# Patient Record
Sex: FEMALE | Race: BLACK OR AFRICAN AMERICAN | NOT HISPANIC OR LATINO
[De-identification: names, ages, dates, MRNs, and addresses within clinical notes are randomized per-mention and may not be internally consistent; named-entity substitution may affect disease eponyms.]

---

## 2018-10-22 ENCOUNTER — RESULT REVIEW (OUTPATIENT)
Age: 38
End: 2018-10-22

## 2018-10-22 ENCOUNTER — TRANSCRIPTION ENCOUNTER (OUTPATIENT)
Age: 38
End: 2018-10-22

## 2018-10-22 ENCOUNTER — APPOINTMENT (OUTPATIENT)
Dept: HEMATOLOGY ONCOLOGY | Facility: CLINIC | Age: 38
End: 2018-10-22
Payer: COMMERCIAL

## 2018-10-22 VITALS
SYSTOLIC BLOOD PRESSURE: 124 MMHG | RESPIRATION RATE: 16 BRPM | HEIGHT: 67.4 IN | TEMPERATURE: 98.5 F | DIASTOLIC BLOOD PRESSURE: 72 MMHG | HEART RATE: 57 BPM | BODY MASS INDEX: 25.75 KG/M2 | OXYGEN SATURATION: 100 % | WEIGHT: 165.99 LBS

## 2018-10-22 DIAGNOSIS — Z86.39 PERSONAL HISTORY OF OTHER ENDOCRINE, NUTRITIONAL AND METABOLIC DISEASE: ICD-10-CM

## 2018-10-22 DIAGNOSIS — Z87.09 PERSONAL HISTORY OF OTHER DISEASES OF THE RESPIRATORY SYSTEM: ICD-10-CM

## 2018-10-22 DIAGNOSIS — Z82.0 FAMILY HISTORY OF EPILEPSY AND OTHER DISEASES OF THE NERVOUS SYSTEM: ICD-10-CM

## 2018-10-22 DIAGNOSIS — Z84.81 FAMILY HISTORY OF CARRIER OF GENETIC DISEASE: ICD-10-CM

## 2018-10-22 PROCEDURE — 99205 OFFICE O/P NEW HI 60 MIN: CPT

## 2018-10-22 RX ORDER — ALBUTEROL SULFATE 90 UG/1
AEROSOL, METERED RESPIRATORY (INHALATION)
Refills: 0 | Status: ACTIVE | COMMUNITY

## 2018-10-22 RX ORDER — LEVOTHYROXINE SODIUM 0.15 MG/1
150 TABLET ORAL
Refills: 0 | Status: ACTIVE | COMMUNITY

## 2018-10-22 RX ORDER — LEVOTHYROXINE SODIUM 0.2 MG/1
200 TABLET ORAL
Refills: 0 | Status: COMPLETED | COMMUNITY
End: 2018-10-22

## 2018-10-22 RX ORDER — FEXOFENADINE HYDROCHLORIDE 180 MG/1
180 TABLET, FILM COATED ORAL
Refills: 0 | Status: ACTIVE | COMMUNITY

## 2018-10-22 RX ORDER — ALBUTEROL SULFATE 2.5 MG/3ML
(2.5 MG/3ML) SOLUTION RESPIRATORY (INHALATION)
Refills: 0 | Status: ACTIVE | COMMUNITY

## 2018-10-23 ENCOUNTER — RESULT REVIEW (OUTPATIENT)
Age: 38
End: 2018-10-23

## 2018-10-23 ENCOUNTER — APPOINTMENT (OUTPATIENT)
Dept: HEMATOLOGY ONCOLOGY | Facility: CLINIC | Age: 38
End: 2018-10-23
Payer: COMMERCIAL

## 2018-10-23 PROCEDURE — 99499A: CUSTOM | Mod: NC

## 2018-11-16 ENCOUNTER — RECORD ABSTRACTING (OUTPATIENT)
Age: 38
End: 2018-11-16

## 2018-11-16 DIAGNOSIS — Z78.9 OTHER SPECIFIED HEALTH STATUS: ICD-10-CM

## 2018-12-13 ENCOUNTER — APPOINTMENT (OUTPATIENT)
Dept: HEMATOLOGY ONCOLOGY | Facility: CLINIC | Age: 38
End: 2018-12-13

## 2019-06-25 ENCOUNTER — TRANSCRIPTION ENCOUNTER (OUTPATIENT)
Age: 39
End: 2019-06-25

## 2019-06-25 ENCOUNTER — APPOINTMENT (OUTPATIENT)
Dept: OBGYN | Facility: CLINIC | Age: 39
End: 2019-06-25
Payer: COMMERCIAL

## 2019-06-25 PROCEDURE — 99213 OFFICE O/P EST LOW 20 MIN: CPT

## 2019-07-15 LAB
C TRACH RRNA SPEC QL NAA+PROBE: NOT DETECTED
CANDIDA VAG CYTO: NOT DETECTED
G VAGINALIS+PREV SP MTYP VAG QL MICRO: NOT DETECTED
HBV SURFACE AG SER QL: NONREACTIVE
HCV AB SER QL: NONREACTIVE
HCV S/CO RATIO: 0.09 S/CO
HIV1+2 AB SPEC QL IA.RAPID: NONREACTIVE
HSV 1+2 IGG SER IA-IMP: NEGATIVE
HSV 1+2 IGG SER IA-IMP: NEGATIVE
HSV1 IGG SER QL: 0.18 INDEX
HSV1 IGM SER QL: NORMAL TITER
HSV2 AB FLD-ACNC: NORMAL TITER
HSV2 IGG SER QL: 0.09 INDEX
N GONORRHOEA RRNA SPEC QL NAA+PROBE: NOT DETECTED
SOURCE AMPLIFICATION: NORMAL
T PALLIDUM AB SER QL IA: NEGATIVE
T VAGINALIS VAG QL WET PREP: NOT DETECTED

## 2019-07-17 ENCOUNTER — APPOINTMENT (OUTPATIENT)
Dept: OBGYN | Facility: CLINIC | Age: 39
End: 2019-07-17
Payer: COMMERCIAL

## 2019-07-17 VITALS
HEIGHT: 67 IN | WEIGHT: 165 LBS | DIASTOLIC BLOOD PRESSURE: 70 MMHG | SYSTOLIC BLOOD PRESSURE: 112 MMHG | BODY MASS INDEX: 25.9 KG/M2

## 2019-07-17 DIAGNOSIS — D21.9 BENIGN NEOPLASM OF CONNECTIVE AND OTHER SOFT TISSUE, UNSPECIFIED: ICD-10-CM

## 2019-07-17 PROCEDURE — 99395 PREV VISIT EST AGE 18-39: CPT

## 2019-07-22 LAB — HPV HIGH+LOW RISK DNA PNL CVX: NOT DETECTED

## 2019-07-22 NOTE — HISTORY OF PRESENT ILLNESS
[1 Year Ago] : 1 year ago [Good] : being in good health [Healthy Diet] : a healthy diet [Regular Exercise] : regular exercise [Definite:  ___ (Date)] : the last menstrual period was [unfilled] [Normal Amount/Duration] : was of a normal amount and duration [Spotting Between  Menses] : no spotting between menses [Regular Cycle Intervals] : periods have been regular [Menstrual Cramps] : no menstrual cramps [Sexually Active] : is sexually active [Monogamous] : is monogamous

## 2019-07-29 LAB — CYTOLOGY CVX/VAG DOC THIN PREP: ABNORMAL

## 2019-08-26 ENCOUNTER — RX RENEWAL (OUTPATIENT)
Age: 39
End: 2019-08-26

## 2019-11-04 DIAGNOSIS — R92.2 INCONCLUSIVE MAMMOGRAM: ICD-10-CM

## 2019-11-21 PROBLEM — R92.2 DENSE BREASTS: Status: ACTIVE | Noted: 2019-11-21

## 2019-11-27 ENCOUNTER — RESULT REVIEW (OUTPATIENT)
Age: 39
End: 2019-11-27

## 2020-02-03 ENCOUNTER — RX CHANGE (OUTPATIENT)
Age: 40
End: 2020-02-03

## 2020-02-06 ENCOUNTER — APPOINTMENT (OUTPATIENT)
Dept: OBGYN | Facility: CLINIC | Age: 40
End: 2020-02-06
Payer: COMMERCIAL

## 2020-02-06 VITALS
BODY MASS INDEX: 25.9 KG/M2 | WEIGHT: 165 LBS | SYSTOLIC BLOOD PRESSURE: 110 MMHG | HEIGHT: 67 IN | DIASTOLIC BLOOD PRESSURE: 70 MMHG

## 2020-02-06 DIAGNOSIS — N89.8 OTHER SPECIFIED NONINFLAMMATORY DISORDERS OF VAGINA: ICD-10-CM

## 2020-02-06 DIAGNOSIS — Z92.89 PERSONAL HISTORY OF OTHER MEDICAL TREATMENT: ICD-10-CM

## 2020-02-06 DIAGNOSIS — Z86.19 PERSONAL HISTORY OF OTHER INFECTIOUS AND PARASITIC DISEASES: ICD-10-CM

## 2020-02-06 DIAGNOSIS — Z01.419 ENCOUNTER FOR GYNECOLOGICAL EXAMINATION (GENERAL) (ROUTINE) W/OUT ABNORMAL FINDINGS: ICD-10-CM

## 2020-02-06 DIAGNOSIS — Z11.51 ENCOUNTER FOR SCREENING FOR HUMAN PAPILLOMAVIRUS (HPV): ICD-10-CM

## 2020-02-06 PROCEDURE — 36415 COLL VENOUS BLD VENIPUNCTURE: CPT

## 2020-02-06 PROCEDURE — 99213 OFFICE O/P EST LOW 20 MIN: CPT

## 2020-02-06 RX ORDER — FLUCONAZOLE 150 MG/1
150 TABLET ORAL
Qty: 2 | Refills: 0 | Status: DISCONTINUED | COMMUNITY
Start: 2019-08-26 | End: 2020-02-06

## 2020-02-06 NOTE — REVIEW OF SYSTEMS
[Vaginal Discharge] : vaginal discharge [Vaginal Itching] : vaginal itching [Vaginal Odor] : vaginal odor [Nl] : Integumentary

## 2020-02-06 NOTE — PHYSICAL EXAM
[Awake] : awake [Alert] : alert [Soft] : soft [Normal Mental Status] : the patient was oriented to person, place and time [Appropriate] : appropriate [No Lesions] : no genitalia lesions [Normal] : cervix [Discharge] : a  ~M vaginal discharge was present [Moderate] : moderate [No Bleeding] : there was no active vaginal bleeding [White] : white [Thick] : thick [Uterine Adnexae] : were not tender and not enlarged [Acute Distress] : no acute distress [Tender] : non tender [Erythema] : no erythema [Motion Tenderness] : there was no cervical motion tenderness [Foul Smelling] : not foul smelling

## 2020-02-07 LAB
C TRACH RRNA SPEC QL NAA+PROBE: NOT DETECTED
HBV SURFACE AG SER QL: NONREACTIVE
HCV AB SER QL: NONREACTIVE
HCV S/CO RATIO: 0.15 S/CO
N GONORRHOEA RRNA SPEC QL NAA+PROBE: NOT DETECTED
SOURCE AMPLIFICATION: NORMAL
T PALLIDUM AB SER QL IA: NEGATIVE

## 2020-02-10 LAB
CANDIDA VAG CYTO: NOT DETECTED
G VAGINALIS+PREV SP MTYP VAG QL MICRO: DETECTED
HIV1+2 AB SPEC QL IA.RAPID: NONREACTIVE
T VAGINALIS VAG QL WET PREP: NOT DETECTED

## 2020-04-20 ENCOUNTER — RESULT REVIEW (OUTPATIENT)
Age: 40
End: 2020-04-20

## 2020-04-26 ENCOUNTER — MESSAGE (OUTPATIENT)
Age: 40
End: 2020-04-26

## 2020-05-23 LAB
SARS-COV-2 IGG SERPL IA-ACNC: 0.1 INDEX
SARS-COV-2 IGG SERPL QL IA: NEGATIVE

## 2020-06-11 ENCOUNTER — APPOINTMENT (OUTPATIENT)
Dept: HEMATOLOGY ONCOLOGY | Facility: CLINIC | Age: 40
End: 2020-06-11

## 2020-09-09 ENCOUNTER — APPOINTMENT (OUTPATIENT)
Dept: OBGYN | Facility: CLINIC | Age: 40
End: 2020-09-09
Payer: COMMERCIAL

## 2020-09-09 ENCOUNTER — LABORATORY RESULT (OUTPATIENT)
Age: 40
End: 2020-09-09

## 2020-09-09 DIAGNOSIS — N89.8 OTHER SPECIFIED NONINFLAMMATORY DISORDERS OF VAGINA: ICD-10-CM

## 2020-09-09 PROCEDURE — 99213 OFFICE O/P EST LOW 20 MIN: CPT

## 2020-09-14 PROBLEM — N89.8 VAGINAL DISCHARGE: Status: ACTIVE | Noted: 2020-02-06

## 2020-09-24 ENCOUNTER — RESULT REVIEW (OUTPATIENT)
Age: 40
End: 2020-09-24

## 2020-09-24 ENCOUNTER — APPOINTMENT (OUTPATIENT)
Dept: HEMATOLOGY ONCOLOGY | Facility: CLINIC | Age: 40
End: 2020-09-24
Payer: COMMERCIAL

## 2020-09-24 VITALS
OXYGEN SATURATION: 100 % | HEIGHT: 67 IN | DIASTOLIC BLOOD PRESSURE: 73 MMHG | WEIGHT: 176 LBS | RESPIRATION RATE: 18 BRPM | TEMPERATURE: 98.1 F | BODY MASS INDEX: 27.62 KG/M2 | HEART RATE: 69 BPM | SYSTOLIC BLOOD PRESSURE: 124 MMHG

## 2020-09-24 DIAGNOSIS — R06.02 SHORTNESS OF BREATH: ICD-10-CM

## 2020-09-24 PROCEDURE — 99214 OFFICE O/P EST MOD 30 MIN: CPT

## 2020-09-24 RX ORDER — FLUCONAZOLE 150 MG/1
150 TABLET ORAL
Qty: 2 | Refills: 2 | Status: COMPLETED | COMMUNITY
Start: 2020-02-06 | End: 2020-09-24

## 2020-09-24 NOTE — REVIEW OF SYSTEMS
[Negative] : Allergic/Immunologic [Fatigue] : fatigue [Shortness Of Breath] : shortness of breath [SOB on Exertion] : shortness of breath during exertion [Dizziness] : dizziness [de-identified] : LMP 9/23

## 2020-09-24 NOTE — ASSESSMENT
[FreeTextEntry1] : 39 year old female presents for follow up of anemia.\par \par 1.  Anemia- worsening (8.4), venofer [id number] (iron sat 4%, repeat ferritin, vit b12, folate pending)\par \par 2. Thrombocythemia ongoing (589K) likely reactive, secondary to anemia, vaginal blood loss.  If no nutritional deficiency might require bone marrow biopsy. (582 10/2018, 340 12/2018, 390 4/2020)\par \par 3. BRCA-2 mutation - close surveillance with MRI, mammogram \par \par Case and management reviewed with Dr. Garcia\par Next visit 4 weeks\par CBC, CMP, lytes, irons

## 2020-09-24 NOTE — HISTORY OF PRESENT ILLNESS
[de-identified] : Patient is a 37 year old female self referred for anemia and thrombocytosis. She reports irregular menstruation and was being followed by GYN and a gastroenterologist. She had a colonoscopy and EGD done on 08/07/2015 by Dr. Tray Rangel - the perianal and digital rectal examinations were both normal. Pertinent negatives included normal sphincter toner. The ileum and colon (entire examined portion) appeared normal. The entire examined colon appeared normal on direct and retroflexion views. Endoscopy revealed normal esophagus. Normal stomach and gastric fundus. Normal duodenal bulb, 2nd part of the duodenum, 3rd part of the duodenum, and 4th part of the duodenum. Biopsied - normal, pathology, normal examined jejunum. Abdominal US done on 07/21/2015 - midline images through the pancreas appear unremarkable, Liver normal; gallbladder alexandra. Stable pulmonary nodule. \par \par Patient has a family history of breast cancer and has both a FH and personal history of BRCA 2 mutation. Patient has a history complex cyst and underwent a needle aspiration in 2016; clip placed. Followed yearly with mammogram and sonogram. Last mammogram was done in  July 2018- benign findings.   US transvaginal and abdominal - small fibroid and small amount of peritoneal ascites. Patient reports that she has had iron infusions with the last one being in 2016. \par \par Patient reports that she is having restless leg syndrome worsening at night. Patient reports tingling in hands and feet in last year that resolved with oral Fe but has since mildly returned in feet. Patient reports stopping oral Fe approximately 1 month ago due to constipation in conjunction with Levothyroxine. She reports she has stable lung nodules in both lungs. Her last scan was done 05/2018 - here at [place]. She has SOB at rest and on exertion at times, in addition to asthma for which she uses Albuterol inhaler and nebulizer. Patient report she is not on any long acting treatments for respiratory status at this time.  [de-identified] : The patient reprots for follow up of anemia.  She had recent bloodwork done by Dr. Robles, iron sat 4%.  She reports her symptoms of fatigue, SOB, feeling dizzy and lightheaded have significantly worsened over the past few weeks.  Her menses remain quite heavy.  She reports restless leg- like symptoms with some tingling.  She denies palpitations, nausea, vomiting, constipation, diarrhea, and bleeding.

## 2020-10-09 LAB
BASOPHILS # BLD AUTO: 0.13 K/UL
BASOPHILS NFR BLD AUTO: 1.7 %
C TRACH RRNA SPEC QL NAA+PROBE: NOT DETECTED
CANDIDA VAG CYTO: NOT DETECTED
EOSINOPHIL # BLD AUTO: 0.69 K/UL
EOSINOPHIL NFR BLD AUTO: 8.9 %
G VAGINALIS+PREV SP MTYP VAG QL MICRO: DETECTED
HBV SURFACE AG SER QL: NONREACTIVE
HCT VFR BLD CALC: 29.8 %
HCV AB SER QL: NONREACTIVE
HCV S/CO RATIO: 0.12 S/CO
HGB BLD-MCNC: 8.3 G/DL
HIV1+2 AB SPEC QL IA.RAPID: NONREACTIVE
HSV 1+2 IGG SER IA-IMP: NEGATIVE
HSV 1+2 IGG SER IA-IMP: NEGATIVE
HSV1 IGG SER QL: 0.14 INDEX
HSV1 IGM SER QL: NORMAL TITER
HSV2 AB FLD-ACNC: NORMAL TITER
HSV2 IGG SER QL: 0.16 INDEX
IMM GRANULOCYTES NFR BLD AUTO: 0.1 %
IRON SATN MFR SERPL: 4 %
IRON SERPL-MCNC: 18 UG/DL
LYMPHOCYTES # BLD AUTO: 2.06 K/UL
LYMPHOCYTES NFR BLD AUTO: 26.4 %
MAN DIFF?: NORMAL
MCHC RBC-ENTMCNC: 18.4 PG
MCHC RBC-ENTMCNC: 27.9 GM/DL
MCV RBC AUTO: 65.9 FL
MONOCYTES # BLD AUTO: 0.63 K/UL
MONOCYTES NFR BLD AUTO: 8.1 %
N GONORRHOEA RRNA SPEC QL NAA+PROBE: NOT DETECTED
NEUTROPHILS # BLD AUTO: 4.27 K/UL
NEUTROPHILS NFR BLD AUTO: 54.8 %
PLATELET # BLD AUTO: 589 K/UL
RBC # BLD: 4.52 M/UL
RBC # FLD: 21.4 %
SOURCE AMPLIFICATION: NORMAL
T PALLIDUM AB SER QL IA: NEGATIVE
T VAGINALIS VAG QL WET PREP: NOT DETECTED
TIBC SERPL-MCNC: 455 UG/DL
UIBC SERPL-MCNC: 438 UG/DL
WBC # FLD AUTO: 7.79 K/UL

## 2020-10-09 RX ORDER — METRONIDAZOLE 500 MG/1
500 TABLET ORAL TWICE DAILY
Qty: 14 | Refills: 0 | Status: COMPLETED | COMMUNITY
Start: 2020-09-11 | End: 2020-09-24

## 2020-10-21 NOTE — HISTORY OF PRESENT ILLNESS
[de-identified] : The patient reprots for follow up of anemia.  She had recent bloodwork done by Dr. Robles, iron sat 4%.  She reports her symptoms of fatigue, SOB, feeling dizzy and lightheaded have significantly worsened over the past few weeks.  Her menses remain quite heavy.  She reports restless leg- like symptoms with some tingling.  She denies palpitations, nausea, vomiting, constipation, diarrhea, and bleeding.  [de-identified] : Patient is a 37 year old female self referred for anemia and thrombocytosis. She reports irregular menstruation and was being followed by GYN and a gastroenterologist. She had a colonoscopy and EGD done on 08/07/2015 by Dr. Tray Rangel - the perianal and digital rectal examinations were both normal. Pertinent negatives included normal sphincter toner. The ileum and colon (entire examined portion) appeared normal. The entire examined colon appeared normal on direct and retroflexion views. Endoscopy revealed normal esophagus. Normal stomach and gastric fundus. Normal duodenal bulb, 2nd part of the duodenum, 3rd part of the duodenum, and 4th part of the duodenum. Biopsied - normal, pathology, normal examined jejunum. Abdominal US done on 07/21/2015 - midline images through the pancreas appear unremarkable, Liver normal; gallbladder alexandra. Stable pulmonary nodule. \par \par Patient has a family history of breast cancer and has both a FH and personal history of BRCA 2 mutation. Patient has a history complex cyst and underwent a needle aspiration in 2016; clip placed. Followed yearly with mammogram and sonogram. Last mammogram was done in  July 2018- benign findings.   US transvaginal and abdominal - small fibroid and small amount of peritoneal ascites. Patient reports that she has had iron infusions with the last one being in 2016. \par \par Patient reports that she is having restless leg syndrome worsening at night. Patient reports tingling in hands and feet in last year that resolved with oral Fe but has since mildly returned in feet. Patient reports stopping oral Fe approximately 1 month ago due to constipation in conjunction with Levothyroxine. She reports she has stable lung nodules in both lungs. Her last scan was done 05/2018 - here at [place]. She has SOB at rest and on exertion at times, in addition to asthma for which she uses Albuterol inhaler and nebulizer. Patient report she is not on any long acting treatments for respiratory status at this time.

## 2020-10-21 NOTE — REVIEW OF SYSTEMS
[Fatigue] : fatigue [Shortness Of Breath] : shortness of breath [SOB on Exertion] : shortness of breath during exertion [Dizziness] : dizziness [Negative] : Allergic/Immunologic [de-identified] : LMP 9/23

## 2020-10-21 NOTE — ASSESSMENT
[FreeTextEntry1] : 39 year old female presents for follow up of anemia.\par \par 1.  Anemia- worsening (8.4), venofer [id number] (iron sat 4%, repeat ferritin, vit b12, folate pending)\par \par 2. Thrombocythemia ongoing (589K) likely reactive, secondary to anemia, vaginal blood loss.  If no nutritional deficiency might require bone marrow biopsy. (582 10/2018, 340 12/2018, 390 4/2020)\par \par 3. BRCA-2 mutation - close surveillance with MRI, mammogram \par \par Case and management reviewed with Dr. Garcia\par Next visit 4 weeks\par CBC, CMP, lytes, irons

## 2020-10-22 ENCOUNTER — APPOINTMENT (OUTPATIENT)
Dept: HEMATOLOGY ONCOLOGY | Facility: CLINIC | Age: 40
End: 2020-10-22

## 2020-12-23 PROBLEM — Z86.19 HISTORY OF CANDIDIASIS OF VAGINA: Status: RESOLVED | Noted: 2019-08-26 | Resolved: 2020-12-23

## 2021-02-16 DIAGNOSIS — Z12.31 ENCOUNTER FOR SCREENING MAMMOGRAM FOR MALIGNANT NEOPLASM OF BREAST: ICD-10-CM

## 2021-03-10 ENCOUNTER — APPOINTMENT (OUTPATIENT)
Dept: OBGYN | Facility: CLINIC | Age: 41
End: 2021-03-10
Payer: COMMERCIAL

## 2021-03-10 VITALS
DIASTOLIC BLOOD PRESSURE: 70 MMHG | SYSTOLIC BLOOD PRESSURE: 120 MMHG | BODY MASS INDEX: 28.88 KG/M2 | WEIGHT: 184 LBS | HEIGHT: 67 IN

## 2021-03-10 DIAGNOSIS — Z01.419 ENCOUNTER FOR GYNECOLOGICAL EXAMINATION (GENERAL) (ROUTINE) W/OUT ABNORMAL FINDINGS: ICD-10-CM

## 2021-03-10 PROCEDURE — 99396 PREV VISIT EST AGE 40-64: CPT

## 2021-03-10 PROCEDURE — 99072 ADDL SUPL MATRL&STAF TM PHE: CPT

## 2021-03-15 ENCOUNTER — RESULT REVIEW (OUTPATIENT)
Age: 41
End: 2021-03-15

## 2021-03-17 PROBLEM — Z01.419 ENCOUNTER FOR ANNUAL ROUTINE GYNECOLOGICAL EXAMINATION: Status: RESOLVED | Noted: 2021-03-17 | Resolved: 2021-03-31

## 2021-03-17 NOTE — HISTORY OF PRESENT ILLNESS
[FreeTextEntry1] : 40 y o P0 female presents for routine annual GYN care\par Last pap smear 7/2019 NILM HPV negative\par Denies current GYN complaints\par Reports normal bowel and bladder function\par Sexually active w/o concerns\par Monthly menses: menorrhagia. Known h/o fibroids\par Previously discussed her BRCA positive status and referral to [facility] as she desires children and breast surgeon for exploration of mastectomy as desired. She has not followed through with referrals due to anxiety\par Breast imaging 11/2019 Benign BI-RADS 2

## 2021-03-23 ENCOUNTER — RESULT REVIEW (OUTPATIENT)
Age: 41
End: 2021-03-23

## 2021-03-23 ENCOUNTER — APPOINTMENT (OUTPATIENT)
Dept: HEMATOLOGY ONCOLOGY | Facility: CLINIC | Age: 41
End: 2021-03-23
Payer: COMMERCIAL

## 2021-03-23 VITALS
BODY MASS INDEX: 27.87 KG/M2 | RESPIRATION RATE: 16 BRPM | OXYGEN SATURATION: 100 % | WEIGHT: 186 LBS | SYSTOLIC BLOOD PRESSURE: 120 MMHG | DIASTOLIC BLOOD PRESSURE: 76 MMHG | TEMPERATURE: 97.8 F | HEIGHT: 68.4 IN | HEART RATE: 66 BPM

## 2021-03-23 DIAGNOSIS — Z00.00 ENCOUNTER FOR GENERAL ADULT MEDICAL EXAMINATION W/OUT ABNORMAL FINDINGS: ICD-10-CM

## 2021-03-23 PROCEDURE — 99072 ADDL SUPL MATRL&STAF TM PHE: CPT

## 2021-03-23 PROCEDURE — 99214 OFFICE O/P EST MOD 30 MIN: CPT

## 2021-03-23 NOTE — HISTORY OF PRESENT ILLNESS
[de-identified] : The patient reports for follow up of anemia.  She had recent bloodwork done by Dr. Robles, iron sat 4%.  She reports her symptoms of fatigue, SOB, feeling dizzy and lightheaded have significantly worsened over the past few weeks.  Her menses remain quite heavy. She denies palpitations, nausea, vomiting, constipation, diarrhea, and bleeding. Patient had a transvaginal ultrasound which was normal and a mammogram/US is on Thursday. Patient was also diagnosis with lichen sclerosis from the gynecologist.  [de-identified] : Patient is a 37 year old female self referred for anemia and thrombocytosis. She reports irregular menstruation and was being followed by GYN and a gastroenterologist. She had a colonoscopy and EGD done on 08/07/2015 by Dr. Tray Rangel - the perianal and digital rectal examinations were both normal. Pertinent negatives included normal sphincter toner. The ileum and colon (entire examined portion) appeared normal. The entire examined colon appeared normal on direct and retroflexion views. Endoscopy revealed normal esophagus. Normal stomach and gastric fundus. Normal duodenal bulb, 2nd part of the duodenum, 3rd part of the duodenum, and 4th part of the duodenum. Biopsied - normal, pathology, normal examined jejunum. Abdominal US done on 07/21/2015 - midline images through the pancreas appear unremarkable, Liver normal; gallbladder alexandra. Stable pulmonary nodule. \par \par Patient has a family history of breast cancer and has both a FH and personal history of BRCA 2 mutation. Patient has a history complex cyst and underwent a needle aspiration in 2016; clip placed. Followed yearly with mammogram and sonogram. Last mammogram was done in  July 2018- benign findings.   US transvaginal and abdominal - small fibroid and small amount of peritoneal ascites. Patient reports that she has had iron infusions with the last one being in 2016. \par \par Patient reports that she is having restless leg syndrome worsening at night. Patient reports tingling in hands and feet in last year that resolved with oral Fe but has since mildly returned in feet. Patient reports stopping oral Fe approximately 1 month ago due to constipation in conjunction with Levothyroxine. She reports she has stable lung nodules in both lungs. Her last scan was done 05/2018 - here at [place]. She has SOB at rest and on exertion at times, in addition to asthma for which she uses Albuterol inhaler and nebulizer. Patient report she is not on any long acting treatments for respiratory status at this time.

## 2021-03-23 NOTE — ASSESSMENT
[FreeTextEntry1] : 40 year old female presents for follow up of anemia.\par Patient with BRCA -2 \par \par 1.  Anemia- worsening 9.2 venofer [id number] (iron sat 4%, repeat ferritin, vit b12, folate pending)\par \par 2. Thrombocythemia ongoing (589K) likely reactive, secondary to anemia, vaginal blood loss.  If no nutritional deficiency might require bone marrow biopsy. (582 10/2018, 340 12/2018, 390 4/2020)\par \par 3. BRCA-2 mutation - close surveillance with MRI, mammogram. 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  She wanted to have children but has no partner at this time.  \par Patient was scheduled in the office for MRI and breast surgeon Dr. Woodall consultation. She is also overdue for mammogram and breast US. \par Reviewed with patient indication for RRSO. \par She is referred for consultation with Dr. Cece Dumont at [facility]. \par \par GM - breast ca x 2 - age 55\par Mother BCA  age 55\par GM - twin sister in the 40's\par \par Next visit 4 weeks\par CBC, CMP, lytes, irons

## 2021-03-23 NOTE — REVIEW OF SYSTEMS
[Fatigue] : fatigue [Shortness Of Breath] : shortness of breath [SOB on Exertion] : shortness of breath during exertion [Dizziness] : dizziness [Negative] : Allergic/Immunologic [Recent Change In Weight] : ~T recent weight change [Wheezing] : wheezing [Fainting] : fainting [FreeTextEntry6] : hx of asthma  [de-identified] : heavy menses  [de-identified] : LMP 9/23

## 2021-03-25 ENCOUNTER — RESULT REVIEW (OUTPATIENT)
Age: 41
End: 2021-03-25

## 2021-03-26 ENCOUNTER — RESULT REVIEW (OUTPATIENT)
Age: 41
End: 2021-03-26

## 2021-03-26 ENCOUNTER — APPOINTMENT (OUTPATIENT)
Dept: BREAST CENTER | Facility: CLINIC | Age: 41
End: 2021-03-26
Payer: COMMERCIAL

## 2021-03-26 VITALS
BODY MASS INDEX: 28.88 KG/M2 | HEIGHT: 67 IN | WEIGHT: 184 LBS | SYSTOLIC BLOOD PRESSURE: 123 MMHG | OXYGEN SATURATION: 100 % | HEART RATE: 63 BPM | DIASTOLIC BLOOD PRESSURE: 90 MMHG

## 2021-03-26 PROCEDURE — 99072 ADDL SUPL MATRL&STAF TM PHE: CPT

## 2021-03-26 PROCEDURE — 99204 OFFICE O/P NEW MOD 45 MIN: CPT

## 2021-03-30 ENCOUNTER — APPOINTMENT (OUTPATIENT)
Dept: PLASTIC SURGERY | Facility: CLINIC | Age: 41
End: 2021-03-30
Payer: COMMERCIAL

## 2021-03-30 VITALS — WEIGHT: 184 LBS | HEIGHT: 67 IN | BODY MASS INDEX: 28.88 KG/M2

## 2021-03-30 PROCEDURE — 99072 ADDL SUPL MATRL&STAF TM PHE: CPT

## 2021-03-30 PROCEDURE — 99204 OFFICE O/P NEW MOD 45 MIN: CPT

## 2021-04-06 NOTE — HISTORY OF PRESENT ILLNESS
[FreeTextEntry1] : Ms. Tatum is a 41 y/o woman with BRCA mutation who presented to review options for breast reconstruction.\par \par In good health.\par Of note has developed multiple keloids across her shoulders and chest.\par \par Exam:\par b/l C cup breasts, minimal ptosis\par Excellent abdominal donor site for ERNESTO flaps\par multiple keloids across chest and back\par \par Today we reviewed all options for breast reconstruction including implant and autologous options.\par Nature of each surgery, its risks, benefits, alternatives, expected postoperative course, recovery and long term results were reviewed.\par Staged nature of surgery, with a planned revision phase reviewed.\par Risks specific to microsurgical tissue transfer including partial or total flap loss were reviewed.\par All questions answered. Ms. TATUM expressed understanding  and wishes to think more about her options before scheduling surgery..\par \par Will coordinate surgery for the near future.\par

## 2021-04-29 ENCOUNTER — APPOINTMENT (OUTPATIENT)
Dept: HUMAN REPRODUCTION | Facility: CLINIC | Age: 41
End: 2021-04-29

## 2021-08-17 LAB
C TRACH RRNA SPEC QL NAA+PROBE: NOT DETECTED
HBV SURFACE AG SER QL: NONREACTIVE
HCV AB SER QL: NONREACTIVE
HCV S/CO RATIO: 0.07 S/CO
HIV1+2 AB SPEC QL IA.RAPID: NONREACTIVE
HSV 1+2 IGG SER IA-IMP: NEGATIVE
HSV 1+2 IGG SER IA-IMP: NEGATIVE
HSV1 IGG SER QL: 0.17 INDEX
HSV1 IGM SER QL: NORMAL TITER
HSV2 AB FLD-ACNC: NORMAL TITER
HSV2 IGG SER QL: 0.08 INDEX
N GONORRHOEA RRNA SPEC QL NAA+PROBE: NOT DETECTED
SOURCE AMPLIFICATION: NORMAL
T PALLIDUM AB SER QL IA: NEGATIVE

## 2021-08-17 RX ORDER — SECNIDAZOLE 2 G/4.8G
2 GRANULE ORAL
Qty: 1 | Refills: 0 | Status: COMPLETED | COMMUNITY
Start: 2020-02-03 | End: 2020-02-06

## 2021-11-15 ENCOUNTER — RESULT REVIEW (OUTPATIENT)
Age: 41
End: 2021-11-15

## 2021-11-18 ENCOUNTER — APPOINTMENT (OUTPATIENT)
Dept: PEDIATRIC ALLERGY IMMUNOLOGY | Facility: CLINIC | Age: 41
End: 2021-11-18
Payer: COMMERCIAL

## 2021-11-18 DIAGNOSIS — Z83.3 FAMILY HISTORY OF DIABETES MELLITUS: ICD-10-CM

## 2021-11-18 DIAGNOSIS — T50.B95A ADVERSE EFFECT OF OTHER VIRAL VACCINES, INITIAL ENCOUNTER: ICD-10-CM

## 2021-11-18 DIAGNOSIS — Z84.0 FAMILY HISTORY OF DISEASES OF THE SKIN AND SUBCUTANEOUS TISSUE: ICD-10-CM

## 2021-11-18 PROCEDURE — 99203 OFFICE O/P NEW LOW 30 MIN: CPT | Mod: 95

## 2021-12-10 PROBLEM — T50.B95A ADVERSE EFFECT OF COVID-19 VACCINE: Status: ACTIVE | Noted: 2021-12-10

## 2021-12-10 NOTE — HISTORY OF PRESENT ILLNESS
[Home] : at home, [unfilled] , at the time of the visit. [Other Location: e.g. Home (Enter Location, City,State)___] : at [unfilled] [Verbal consent obtained from patient] : the patient, [unfilled] [de-identified] : Luba is a 41 year old woman with iron deficiency anemia (on monthly iron infusions), graves disease (s/p radioactive iodine ablation), psoriasis, lichen planus, BRCA positive, nasal polyp s/p removal and a hx of left eyelid ptosis (unknown origin). \par \par A week after the vaccine she had angioedema of her mouth and lip paresthesia. Inside of her mouth, mucous membranes felt dry and tingly.  Then she had some blood spots/speckles in the inside of her lip -  never had this previously. These went away after a few days. \par Went to the ED at [facility] and was given IM decadron and epi.  Symptoms improved after this. \par No other new exposures that pt could think of. \par For 3 weeks after the reaction she had numbness and tingling of her lips. \par Allergic rhinitis - some pollen alergies.\par \par Did you receive the Moderna or Pfizer vaccine? Pfizer \par Have you ever had a?previous?allergic/anaphylactic reaction for which an EpiPen?(or other epinephrine autoinjector)?was required???\par NO \par Do you have any history of allergies to medications/drugs?? \par NO\par Have you ever had a reaction to injectable steroids or any other injectable medication? \par Had injectable steroid for a keloid as a teen - no reaction but had a bigger keloid at that site.\par Do you have any history of allergic reactions to vaccines??? \par NO\par Do you have any history of allergies to foods??? \par NO\par Do you have any history of allergy to IV contrast??? \par NO - she has had CTA\par Do you have any history of allergic reaction to bee sting?or other stinging insects??\par NO \par Do you have any history of environmental allergies/hay fever??? \par YES\par Do you have any?history of asthma??? \par YES  - had medrol dose pack 4 months ago\par Do you have any?history of?eczema or?atopic dermatitis??? \par NO\par Do you have any?history of contact dermatitis??? \par NO\par Do you have any?history of hives?(urticaria)?or angioedema?? \par NO random hives. Did have one episode of lip angioedema for no good reason. \par Do you have any?history of a mast cell disorder? \par NO\par Do you have any?history of any other allergic reaction of any kind??? \par NO\par Have you ever had a colonoscopy???When was your last colonoscopy? \par YES - tolerated bowl prep- thinks mg citrate\par Have you had a reaction to preparation for colonoscopy? When? \par NO\par Have you ever used?Miralax?(polyethylene glycol) or another laxative?? When was your last use? \par YES  - has constipation and tolerated - last use was 2 years ago\par Do you have any cosmetic fillers? When was the last procedure?\par NO \par Were you infected with?SARS-COV2?to your knowledge?(Have you ever had COVID-19)??? \par NO\par Have you received passive antibody therapy (monoclonal antibodies or convalescent serum) as treatment for COVID-19?? \par NO\par Do you take a beta-blocker??? \par NO\par Do you take an ACE inhibitor?? \par NO\par Do you use NSAIDs?? Did you take NSAIDs within 24 hours of your vaccine?? \par YES - took tylenol\par Do you have a history of vasovagal reactions (fainting after blood draw, shots or other situations)??? \par YES - vasovagal reactions - once in the shower, had another one when she had a tattoo\par Do you have a history of anxiety or panic attacks??? \par NO\par What is your occupation? 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 \par NO\par What was the timing of your reaction? How soon did you feel any symptoms??? \par What were your symptoms?after receiving the COVID-19 vaccine?in chronological order??? NO\par Did you feel any:?? \par Itchiness? BESSIE\par Rash/urticaria? NO \par flushing NO\par Swelling? lip angioedema\par Difficulty swallowing/lump in throat?  NO\par Shortness of breath/wheezing/cough?? NO\par Abdominal pain/cramping/vomiting/diarrhea? NO\par Dizziness/Lightheadedness/Fainting/Loss of Consciousness? NO\par Impending sense of doom? NO\par What medications were you treated with??? \par Benadryl (or H1 blocker); which one?? YES - took this en route to the ED\par Pepcid (or another H2 blocker); which one?? NO \par Steroids (PO/IV/IM); which one?? Decdron IV\par Montelukast? NO\par Epinephrine? YES\par \par Had a CTA lungs - had a CT scan yearly for 3 years - never had biopsy.

## 2021-12-14 ENCOUNTER — APPOINTMENT (OUTPATIENT)
Dept: OBGYN | Facility: CLINIC | Age: 41
End: 2021-12-14
Payer: COMMERCIAL

## 2021-12-14 VITALS
SYSTOLIC BLOOD PRESSURE: 114 MMHG | DIASTOLIC BLOOD PRESSURE: 72 MMHG | BODY MASS INDEX: 28.25 KG/M2 | HEIGHT: 67 IN | WEIGHT: 180 LBS

## 2021-12-14 DIAGNOSIS — N76.0 ACUTE VAGINITIS: ICD-10-CM

## 2021-12-14 DIAGNOSIS — Z11.3 ENCOUNTER FOR SCREENING FOR INFECTIONS WITH A PREDOMINANTLY SEXUAL MODE OF TRANSMISSION: ICD-10-CM

## 2021-12-14 DIAGNOSIS — Z80.3 FAMILY HISTORY OF MALIGNANT NEOPLASM OF BREAST: ICD-10-CM

## 2021-12-14 PROCEDURE — 36415 COLL VENOUS BLD VENIPUNCTURE: CPT

## 2021-12-14 PROCEDURE — 99213 OFFICE O/P EST LOW 20 MIN: CPT

## 2021-12-15 LAB
C TRACH RRNA SPEC QL NAA+PROBE: NOT DETECTED
CANDIDA VAG CYTO: DETECTED
G VAGINALIS+PREV SP MTYP VAG QL MICRO: DETECTED
HIV1+2 AB SPEC QL IA.RAPID: NONREACTIVE
N GONORRHOEA RRNA SPEC QL NAA+PROBE: NOT DETECTED
SOURCE AMPLIFICATION: NORMAL
T VAGINALIS VAG QL WET PREP: NOT DETECTED

## 2021-12-16 DIAGNOSIS — B37.3 CANDIDIASIS OF VULVA AND VAGINA: ICD-10-CM

## 2021-12-16 DIAGNOSIS — N76.0 ACUTE VAGINITIS: ICD-10-CM

## 2021-12-16 DIAGNOSIS — B96.89 ACUTE VAGINITIS: ICD-10-CM

## 2021-12-16 LAB
HBV SURFACE AG SER QL: NONREACTIVE
HCV AB SER QL: NONREACTIVE
HCV S/CO RATIO: 0.09 S/CO

## 2021-12-23 LAB
HSV1 IGM SER QL: NEGATIVE
HSV2 AB FLD-ACNC: NEGATIVE

## 2021-12-28 ENCOUNTER — RESULT REVIEW (OUTPATIENT)
Age: 41
End: 2021-12-28

## 2022-01-05 ENCOUNTER — APPOINTMENT (OUTPATIENT)
Dept: OBGYN | Facility: CLINIC | Age: 42
End: 2022-01-05

## 2022-01-05 ENCOUNTER — APPOINTMENT (OUTPATIENT)
Dept: OBGYN | Facility: CLINIC | Age: 42
End: 2022-01-05
Payer: COMMERCIAL

## 2022-01-05 VITALS
BODY MASS INDEX: 28.88 KG/M2 | SYSTOLIC BLOOD PRESSURE: 120 MMHG | DIASTOLIC BLOOD PRESSURE: 72 MMHG | WEIGHT: 184 LBS | HEIGHT: 67 IN

## 2022-01-05 DIAGNOSIS — B37.3 CANDIDIASIS OF VULVA AND VAGINA: ICD-10-CM

## 2022-01-05 DIAGNOSIS — N76.2 ACUTE VULVITIS: ICD-10-CM

## 2022-01-05 PROCEDURE — 99213 OFFICE O/P EST LOW 20 MIN: CPT

## 2022-01-05 RX ORDER — FLUCONAZOLE 150 MG/1
150 TABLET ORAL
Qty: 15 | Refills: 0 | Status: DISCONTINUED | COMMUNITY
Start: 2022-01-05 | End: 2022-01-05

## 2022-01-06 LAB
CANDIDA VAG CYTO: DETECTED
G VAGINALIS+PREV SP MTYP VAG QL MICRO: DETECTED
HSV+VZV DNA SPEC QL NAA+PROBE: NOT DETECTED
SPECIMEN SOURCE: NORMAL
T VAGINALIS VAG QL WET PREP: NOT DETECTED

## 2022-01-06 RX ORDER — METRONIDAZOLE 500 MG/1
500 TABLET ORAL TWICE DAILY
Qty: 14 | Refills: 0 | Status: DISCONTINUED | COMMUNITY
Start: 2021-12-16 | End: 2022-01-06

## 2022-01-06 RX ORDER — TERCONAZOLE 4 MG/G
0.4 CREAM VAGINAL
Qty: 7 | Refills: 0 | Status: DISCONTINUED | COMMUNITY
Start: 2021-12-16 | End: 2022-01-06

## 2022-01-06 NOTE — HISTORY OF PRESENT ILLNESS
[FreeTextEntry1] : 40 y/o returns for complaint of continued vaginal discomfort/ pain and raw after multiple treatment with fluconazole, terconazole, Monistat and metronidazole.\par \par +yeast and BV at last visit, negative STD panel- 21\par \par Feels symptoms never resolved.\par \par On and off with partner. They do not use condoms. \par \par Grandmother just . Change jobs. 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 Very stressed.\par \par Had allergic reaction to COVID vaccine. Saw allergist/immunologist. Pt reports elevated ESR. No other specific findings. Still undergoing evaluation.\par

## 2022-01-06 NOTE — PHYSICAL EXAM
[Appropriately responsive] : appropriately responsive [Alert] : alert [No Acute Distress] : no acute distress [Oriented x3] : oriented x3 [No Lesions] : no lesions  [Labia Majora] : normal [Labia Minora Erythema] : erythema [Erythematous] : erythema [Discharge] : a  ~M vaginal discharge was present [Moderate] : moderate [White] : white [Cheesy] : cheesy [No Bleeding] : There was no active vaginal bleeding

## 2022-01-06 NOTE — PLAN
[FreeTextEntry1] : -Vaginal panels/cultures done for further evaluation.\par \par -Hold clobetasol and start betamethasone/clotrimazole for two weeks.\par \par -Longer duration fluconazole treatment.\par \par -Start compounded boric acid with acidophilus for two weeks. No intercourse during treatment.\par \par -Vulvar hygiene and comfort measures reviewed in detail.\par \par -I have spent 20 minutes on this encounter.\par

## 2022-01-10 LAB
A VAGINAE DNA VAG QL NAA+PROBE: NORMAL
BACTERIA GENITAL AEROBE CULT: ABNORMAL
BVAB2 DNA VAG QL NAA+PROBE: NORMAL
C KRUSEI DNA VAG QL NAA+PROBE: NEGATIVE
C KRUSEI DNA VAG QL NAA+PROBE: POSITIVE
C TRACH RRNA SPEC QL NAA+PROBE: NEGATIVE
MEGA1 DNA VAG QL NAA+PROBE: NORMAL
N GONORRHOEA RRNA SPEC QL NAA+PROBE: NEGATIVE
T VAGINALIS RRNA SPEC QL NAA+PROBE: NEGATIVE

## 2022-03-21 ENCOUNTER — RX RENEWAL (OUTPATIENT)
Age: 42
End: 2022-03-21

## 2022-05-25 DIAGNOSIS — N39.0 URINARY TRACT INFECTION, SITE NOT SPECIFIED: ICD-10-CM

## 2022-05-25 DIAGNOSIS — R10.2 PELVIC AND PERINEAL PAIN: ICD-10-CM

## 2022-09-06 ENCOUNTER — RX CHANGE (OUTPATIENT)
Age: 42
End: 2022-09-06

## 2023-02-28 ENCOUNTER — RESULT REVIEW (OUTPATIENT)
Age: 43
End: 2023-02-28

## 2023-02-28 ENCOUNTER — APPOINTMENT (OUTPATIENT)
Dept: HEMATOLOGY ONCOLOGY | Facility: CLINIC | Age: 43
End: 2023-02-28
Payer: COMMERCIAL

## 2023-02-28 VITALS
BODY MASS INDEX: 28.05 KG/M2 | TEMPERATURE: 99.5 F | DIASTOLIC BLOOD PRESSURE: 66 MMHG | HEIGHT: 67 IN | SYSTOLIC BLOOD PRESSURE: 111 MMHG | RESPIRATION RATE: 16 BRPM | OXYGEN SATURATION: 100 % | HEART RATE: 68 BPM | WEIGHT: 178.7 LBS

## 2023-02-28 PROCEDURE — 99214 OFFICE O/P EST MOD 30 MIN: CPT | Mod: 25

## 2023-02-28 PROCEDURE — 36415 COLL VENOUS BLD VENIPUNCTURE: CPT

## 2023-04-07 NOTE — ASSESSMENT
[FreeTextEntry1] : 42 year old female presents for follow up of anemia.\par Patient with BRCA -2 \par \par 1.  Anemia- worsening 9.2 venofer [id number] (iron sat 4%, repeat ferritin, vit b12, folate pending)\par \par 2. Thrombocythemia ongoing (589K) likely reactive, secondary to anemia, vaginal blood loss.  If no nutritional deficiency might require bone marrow biopsy. (582 10/2018, 340 12/2018, 390 4/2020). MPNR ordered\par \par 3. BRCA-2 mutation - close surveillance with MRI, mammogram. 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  She wanted to have children but has no partner at this time.  \par Patient was scheduled in the office for MRI and breast surgeon  consultation. She is also overdue for mammogram and breast US. \par Reviewed with patient indication for RRSO. \par Recommended GYN Onc\par \par GM - breast ca x 2 - age 55\par Mother BCA  age 55\par GM - twin sister in the 40's\par \par Next visit 12 weeks\par CBC, CMP, lytes, irons

## 2023-04-07 NOTE — HISTORY OF PRESENT ILLNESS
[de-identified] : The patient reports for follow up of anemia.  She had recent bloodwork done by Dr. Robles, iron sat 4%.  She reports her symptoms of fatigue, SOB, feeling dizzy and lightheaded have significantly worsened over the past few weeks.  Her menses remain quite heavy. She denies palpitations, nausea, vomiting, constipation, diarrhea, and bleeding. Patient had a transvaginal ultrasound which was normal and a mammogram/US is on Thursday. Patient was also diagnosis with lichen sclerosis from the gynecologist.  [de-identified] : Patient is a 37 year old female self referred for anemia and thrombocytosis. She reports irregular menstruation and was being followed by GYN and a gastroenterologist. She had a colonoscopy and EGD done on 08/07/2015 by Dr. Tray Rangel - the perianal and digital rectal examinations were both normal. Pertinent negatives included normal sphincter toner. The ileum and colon (entire examined portion) appeared normal. The entire examined colon appeared normal on direct and retroflexion views. Endoscopy revealed normal esophagus. Normal stomach and gastric fundus. Normal duodenal bulb, 2nd part of the duodenum, 3rd part of the duodenum, and 4th part of the duodenum. Biopsied - normal, pathology, normal examined jejunum. Abdominal US done on 07/21/2015 - midline images through the pancreas appear unremarkable, Liver normal; gallbladder alexandra. Stable pulmonary nodule. \par \par Patient has a family history of breast cancer and has both a FH and personal history of BRCA 2 mutation. Patient has a history complex cyst and underwent a needle aspiration in 2016; clip placed. Followed yearly with mammogram and sonogram. Last mammogram was done in  July 2018- benign findings.   US transvaginal and abdominal - small fibroid and small amount of peritoneal ascites. Patient reports that she has had iron infusions with the last one being in 2016. \par \par Patient reports that she is having restless leg syndrome worsening at night. Patient reports tingling in hands and feet in last year that resolved with oral Fe but has since mildly returned in feet. Patient reports stopping oral Fe approximately 1 month ago due to constipation in conjunction with Levothyroxine. She reports she has stable lung nodules in both lungs. Her last scan was done 05/2018 - here at [place]. She has SOB at rest and on exertion at times, in addition to asthma for which she uses Albuterol inhaler and nebulizer. Patient report she is not on any long acting treatments for respiratory status at this time.

## 2023-04-07 NOTE — REVIEW OF SYSTEMS
[Fatigue] : fatigue [Recent Change In Weight] : ~T recent weight change [Shortness Of Breath] : shortness of breath [Wheezing] : wheezing [SOB on Exertion] : shortness of breath during exertion [Dizziness] : dizziness [Fainting] : fainting [Negative] : Allergic/Immunologic [FreeTextEntry6] : hx of asthma  [de-identified] : heavy menses  [de-identified] : LMP 9/23

## 2023-05-15 ENCOUNTER — NON-APPOINTMENT (OUTPATIENT)
Age: 43
End: 2023-05-15

## 2023-10-17 ENCOUNTER — APPOINTMENT (OUTPATIENT)
Dept: PAIN MANAGEMENT | Facility: CLINIC | Age: 43
End: 2023-10-17
Payer: COMMERCIAL

## 2023-10-17 VITALS
OXYGEN SATURATION: 99 % | HEART RATE: 63 BPM | DIASTOLIC BLOOD PRESSURE: 85 MMHG | BODY MASS INDEX: 27.94 KG/M2 | WEIGHT: 178 LBS | HEIGHT: 67 IN | SYSTOLIC BLOOD PRESSURE: 127 MMHG

## 2023-10-17 PROCEDURE — 99203 OFFICE O/P NEW LOW 30 MIN: CPT

## 2023-10-17 RX ORDER — CYCLOBENZAPRINE HYDROCHLORIDE 5 MG/1
5 TABLET, FILM COATED ORAL
Qty: 25 | Refills: 1 | Status: ACTIVE | COMMUNITY
Start: 2023-10-17 | End: 1900-01-01

## 2023-10-19 ENCOUNTER — RESULT REVIEW (OUTPATIENT)
Age: 43
End: 2023-10-19

## 2023-10-20 ENCOUNTER — RESULT REVIEW (OUTPATIENT)
Age: 43
End: 2023-10-20

## 2023-10-31 ENCOUNTER — APPOINTMENT (OUTPATIENT)
Dept: PAIN MANAGEMENT | Facility: CLINIC | Age: 43
End: 2023-10-31
Payer: COMMERCIAL

## 2023-10-31 VITALS
WEIGHT: 178 LBS | DIASTOLIC BLOOD PRESSURE: 91 MMHG | SYSTOLIC BLOOD PRESSURE: 132 MMHG | OXYGEN SATURATION: 100 % | HEIGHT: 67 IN | HEART RATE: 69 BPM | BODY MASS INDEX: 27.94 KG/M2

## 2023-10-31 DIAGNOSIS — M54.16 RADICULOPATHY, LUMBAR REGION: ICD-10-CM

## 2023-10-31 PROCEDURE — 99213 OFFICE O/P EST LOW 20 MIN: CPT

## 2023-11-27 ENCOUNTER — RX RENEWAL (OUTPATIENT)
Age: 43
End: 2023-11-27

## 2023-12-22 ENCOUNTER — APPOINTMENT (OUTPATIENT)
Dept: OBGYN | Facility: CLINIC | Age: 43
End: 2023-12-22
Payer: COMMERCIAL

## 2023-12-22 VITALS
DIASTOLIC BLOOD PRESSURE: 70 MMHG | HEIGHT: 67 IN | BODY MASS INDEX: 28.09 KG/M2 | SYSTOLIC BLOOD PRESSURE: 110 MMHG | WEIGHT: 179 LBS

## 2023-12-22 DIAGNOSIS — N90.9 NONINFLAMMATORY DISORDER OF VULVA AND PERINEUM, UNSPECIFIED: ICD-10-CM

## 2023-12-22 DIAGNOSIS — N92.0 EXCESSIVE AND FREQUENT MENSTRUATION WITH REGULAR CYCLE: ICD-10-CM

## 2023-12-22 DIAGNOSIS — Z01.419 ENCOUNTER FOR GYNECOLOGICAL EXAMINATION (GENERAL) (ROUTINE) W/OUT ABNORMAL FINDINGS: ICD-10-CM

## 2023-12-22 DIAGNOSIS — Z12.4 ENCOUNTER FOR SCREENING FOR MALIGNANT NEOPLASM OF CERVIX: ICD-10-CM

## 2023-12-22 DIAGNOSIS — Z11.51 ENCOUNTER FOR SCREENING FOR HUMAN PAPILLOMAVIRUS (HPV): ICD-10-CM

## 2023-12-22 PROCEDURE — 99396 PREV VISIT EST AGE 40-64: CPT

## 2023-12-22 RX ORDER — CLOTRIMAZOLE AND BETAMETHASONE DIPROPIONATE 10; .5 MG/G; MG/G
1-0.05 CREAM TOPICAL TWICE DAILY
Qty: 1 | Refills: 1 | Status: DISCONTINUED | COMMUNITY
Start: 2022-01-05 | End: 2023-12-22

## 2023-12-22 RX ORDER — NAPROXEN 500 MG/1
500 TABLET ORAL
Qty: 45 | Refills: 0 | Status: DISCONTINUED | COMMUNITY
Start: 2023-10-17 | End: 2023-12-22

## 2023-12-22 RX ORDER — FLUCONAZOLE 150 MG/1
150 TABLET ORAL
Qty: 15 | Refills: 0 | Status: DISCONTINUED | COMMUNITY
Start: 2022-01-05 | End: 2023-12-22

## 2023-12-22 RX ORDER — CLOBETASOL PROPIONATE 0.5 MG/G
0.05 OINTMENT TOPICAL
Qty: 60 | Refills: 3 | Status: ACTIVE | COMMUNITY
Start: 2020-09-09 | End: 1900-01-01

## 2023-12-22 RX ORDER — NORETHINDRONE 0.35 MG/1
0.35 TABLET ORAL
Qty: 1 | Refills: 0 | Status: DISCONTINUED | COMMUNITY
Start: 2022-08-12 | End: 2023-12-22

## 2023-12-22 NOTE — HISTORY OF PRESENT ILLNESS
[FreeTextEntry1] : 44 y/o  presents for annual GYN exam.  Not sexually currently. Declines STD testing.  Menses heavy and every three weeks   C/o vulvar itching. Was previously diagnosed with Lichens.  BRCA 2 positive.   Followed by Dr Garcia  Has not had a breast surgery follow-up since Dr. Woodall left.  History of auto immune diseases.  Mother, MGM breast ca.  Denies FH of ca of ovary, uterus and colon.  Mammogram 10/23- BI-RADS 2, very dense breast tissue.  Lives with roommate.  Works as a nurse manager in [employer]

## 2023-12-22 NOTE — PHYSICAL EXAM
[Chaperone Declined] : Patient declined chaperone [Appropriately responsive] : appropriately responsive [Alert] : alert [No Acute Distress] : no acute distress [Soft] : soft [Non-tender] : non-tender [Non-distended] : non-distended [No HSM] : No HSM [No Lesions] : no lesions [No Mass] : no mass [Oriented x3] : oriented x3 [Examination Of The Breasts] : a normal appearance [No Discharge] : no discharge [No Masses] : no breast masses were palpable [Labia Majora] : normal [Labia Minora] : normal [Pink Rugae] : pink rugae [Normal] : normal [Tenderness] : nontender [Enlarged ___ wks] : not enlarged [Uterine Adnexae] : normal [FreeTextEntry1] : white plaque at introitus extending into perineum [FreeTextEntry5] : no CMT [FreeTextEntry8] : no adnexal masses or tenderness

## 2023-12-26 LAB — HPV HIGH+LOW RISK DNA PNL CVX: NOT DETECTED

## 2023-12-28 ENCOUNTER — TRANSCRIPTION ENCOUNTER (OUTPATIENT)
Age: 43
End: 2023-12-28

## 2023-12-28 LAB — CYTOLOGY CVX/VAG DOC THIN PREP: NORMAL

## 2024-01-02 ENCOUNTER — TRANSCRIPTION ENCOUNTER (OUTPATIENT)
Age: 44
End: 2024-01-02

## 2024-06-17 ENCOUNTER — RESULT REVIEW (OUTPATIENT)
Age: 44
End: 2024-06-17

## 2024-06-17 ENCOUNTER — APPOINTMENT (OUTPATIENT)
Dept: HEMATOLOGY ONCOLOGY | Facility: CLINIC | Age: 44
End: 2024-06-17
Payer: COMMERCIAL

## 2024-06-17 ENCOUNTER — APPOINTMENT (OUTPATIENT)
Dept: HEMATOLOGY ONCOLOGY | Facility: CLINIC | Age: 44
End: 2024-06-17

## 2024-06-17 VITALS
BODY MASS INDEX: 29.21 KG/M2 | RESPIRATION RATE: 16 BRPM | WEIGHT: 186.13 LBS | TEMPERATURE: 97.7 F | HEIGHT: 67 IN | HEART RATE: 72 BPM | SYSTOLIC BLOOD PRESSURE: 130 MMHG | OXYGEN SATURATION: 96 % | DIASTOLIC BLOOD PRESSURE: 75 MMHG

## 2024-06-17 DIAGNOSIS — N92.0 EXCESSIVE AND FREQUENT MENSTRUATION WITH REGULAR CYCLE: ICD-10-CM

## 2024-06-17 DIAGNOSIS — D50.0 IRON DEFICIENCY ANEMIA SECONDARY TO BLOOD LOSS (CHRONIC): ICD-10-CM

## 2024-06-17 DIAGNOSIS — Z15.01 GENETIC SUSCEPTIBILITY TO MALIGNANT NEOPLASM OF BREAST: ICD-10-CM

## 2024-06-17 DIAGNOSIS — D64.9 ANEMIA, UNSPECIFIED: ICD-10-CM

## 2024-06-17 DIAGNOSIS — D75.839 THROMBOCYTOSIS, UNSPECIFIED: ICD-10-CM

## 2024-06-17 DIAGNOSIS — Z15.09 GENETIC SUSCEPTIBILITY TO MALIGNANT NEOPLASM OF BREAST: ICD-10-CM

## 2024-06-17 PROCEDURE — 99214 OFFICE O/P EST MOD 30 MIN: CPT

## 2024-06-17 PROCEDURE — 36415 COLL VENOUS BLD VENIPUNCTURE: CPT

## 2024-06-17 RX ORDER — OMALIZUMAB 150 MG/ML
150 INJECTION, SOLUTION SUBCUTANEOUS
Refills: 0 | Status: ACTIVE | COMMUNITY

## 2024-06-17 NOTE — HISTORY OF PRESENT ILLNESS
[de-identified] : The patient reports for follow up of anemia.  Patient was also diagnosis with lichen sclerosis from the gynecologist.  Patient is here for follow up for anemia. Patient states that she is having minor SOB, fatigue, lightheadedness and heavy menses and frequent. She is due for a transvaginal ultrasound. She states that the last time she had venofer she had a reaction and injectafer gave her severe abdominal issues.  [de-identified] : Patient is a 37 year old female self referred for anemia and thrombocytosis. She reports irregular menstruation and was being followed by GYN and a gastroenterologist. She had a colonoscopy and EGD done on 08/07/2015 by Dr. Tray Rangel - the perianal and digital rectal examinations were both normal. Pertinent negatives included normal sphincter toner. The ileum and colon (entire examined portion) appeared normal. The entire examined colon appeared normal on direct and retroflexion views. Endoscopy revealed normal esophagus. Normal stomach and gastric fundus. Normal duodenal bulb, 2nd part of the duodenum, 3rd part of the duodenum, and 4th part of the duodenum. Biopsied - normal, pathology, normal examined jejunum. Abdominal US done on 07/21/2015 - midline images through the pancreas appear unremarkable, Liver normal; gallbladder alexandra. Stable pulmonary nodule. \par  \par  Patient has a family history of breast cancer and has both a FH and personal history of BRCA 2 mutation. Patient has a history complex cyst and underwent a needle aspiration in 2016; clip placed. Followed yearly with mammogram and sonogram. Last mammogram was done in  July 2018- benign findings.   US transvaginal and abdominal - small fibroid and small amount of peritoneal ascites. Patient reports that she has had iron infusions with the last one being in 2016. \par  \par  Patient reports that she is having restless leg syndrome worsening at night. Patient reports tingling in hands and feet in last year that resolved with oral Fe but has since mildly returned in feet. Patient reports stopping oral Fe approximately 1 month ago due to constipation in conjunction with Levothyroxine. She reports she has stable lung nodules in both lungs. Her last scan was done 05/2018 - here at [place]. She has SOB at rest and on exertion at times, in addition to asthma for which she uses Albuterol inhaler and nebulizer. Patient report she is not on any long acting treatments for respiratory status at this time.

## 2024-06-17 NOTE — ASSESSMENT
[FreeTextEntry1] : 42 year old female presents for follow up of anemia. Patient with BRCA -2   1.  Anemia of blood loss- worsening 9.2 venofer 595cuw1 (iron sat 4%, repeat ferritin, vit b12, folate pending) - menorrhagia ongoing - patient symptomatic from iron deficiency anemia - reacted to IV venofer - tolerated Injectafer with abdominal pain  - resume Injectafer ( with premedication)   2. Thrombocythemia ongoing (589K) likely reactive, secondary to anemia, vaginal blood loss.  If no nutritional deficiency might require bone marrow biopsy. (582 10/2018, 340 12/2018, 390 4/2020). MPNR ordered  3. BRCA-2 mutation - close surveillance with MRI, mammogram. 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  She wanted to have children , she has partner x 2 years but didnt have discussion - recommended to discuss plans with partner - gyn onc evaluation - Fertility evaluation  Patient was scheduled in the office for MRI and breast surgeon consultation. She is also overdue for mammogram and breast US.  Reviewed with patient indication for RRSO.  Recommended GYN Onc- Dr. Mounika Stephen -[place]   GM - breast ca x 2 - age 55 Mother BCA age 55 GM - twin sister in the 40's  Next visit 12 weeks CBC, CMP, lytes, irons

## 2024-06-18 ENCOUNTER — NON-APPOINTMENT (OUTPATIENT)
Age: 44
End: 2024-06-18

## 2024-06-18 RX ORDER — METHYLPREDNISOLONE 16 MG/1
16 TABLET ORAL
Qty: 4 | Refills: 0 | Status: ACTIVE | COMMUNITY
Start: 2024-06-17 | End: 1900-01-01

## 2024-06-20 DIAGNOSIS — D21.9 BENIGN NEOPLASM OF CONNECTIVE AND OTHER SOFT TISSUE, UNSPECIFIED: ICD-10-CM

## 2024-07-25 ENCOUNTER — RESULT REVIEW (OUTPATIENT)
Age: 44
End: 2024-07-25

## 2024-07-31 ENCOUNTER — APPOINTMENT (OUTPATIENT)
Dept: ENDOCRINOLOGY | Facility: CLINIC | Age: 44
End: 2024-07-31

## 2024-08-01 ENCOUNTER — RESULT REVIEW (OUTPATIENT)
Age: 44
End: 2024-08-01

## 2024-08-03 ENCOUNTER — TRANSCRIPTION ENCOUNTER (OUTPATIENT)
Age: 44
End: 2024-08-03

## 2024-08-08 ENCOUNTER — TRANSCRIPTION ENCOUNTER (OUTPATIENT)
Age: 44
End: 2024-08-08

## 2024-08-19 ENCOUNTER — RESULT REVIEW (OUTPATIENT)
Age: 44
End: 2024-08-19

## 2024-09-25 DIAGNOSIS — N76.0 ACUTE VAGINITIS: ICD-10-CM

## 2024-09-25 DIAGNOSIS — N76.2 ACUTE VULVITIS: ICD-10-CM

## 2024-09-25 DIAGNOSIS — R92.30 DENSE BREASTS, UNSPECIFIED: ICD-10-CM

## 2024-09-25 DIAGNOSIS — B96.89 ACUTE VAGINITIS: ICD-10-CM

## 2024-09-25 DIAGNOSIS — N90.9 NONINFLAMMATORY DISORDER OF VULVA AND PERINEUM, UNSPECIFIED: ICD-10-CM

## 2024-09-25 DIAGNOSIS — Z11.3 ENCOUNTER FOR SCREENING FOR INFECTIONS WITH A PREDOMINANTLY SEXUAL MODE OF TRANSMISSION: ICD-10-CM

## 2024-09-25 DIAGNOSIS — Z87.42 PERSONAL HISTORY OF OTHER DISEASES OF THE FEMALE GENITAL TRACT: ICD-10-CM

## 2024-09-25 DIAGNOSIS — Z86.19 PERSONAL HISTORY OF OTHER INFECTIOUS AND PARASITIC DISEASES: ICD-10-CM

## 2024-09-25 DIAGNOSIS — Z92.89 PERSONAL HISTORY OF OTHER MEDICAL TREATMENT: ICD-10-CM

## 2024-09-25 DIAGNOSIS — N39.0 URINARY TRACT INFECTION, SITE NOT SPECIFIED: ICD-10-CM

## 2024-09-25 DIAGNOSIS — Z01.419 ENCOUNTER FOR GYNECOLOGICAL EXAMINATION (GENERAL) (ROUTINE) W/OUT ABNORMAL FINDINGS: ICD-10-CM

## 2024-09-25 DIAGNOSIS — M54.16 RADICULOPATHY, LUMBAR REGION: ICD-10-CM

## 2024-09-25 DIAGNOSIS — Z98.890 OTHER SPECIFIED POSTPROCEDURAL STATES: ICD-10-CM

## 2024-09-25 DIAGNOSIS — Z11.51 ENCOUNTER FOR SCREENING FOR HUMAN PAPILLOMAVIRUS (HPV): ICD-10-CM

## 2024-10-04 ENCOUNTER — APPOINTMENT (OUTPATIENT)
Dept: GYNECOLOGIC ONCOLOGY | Facility: CLINIC | Age: 44
End: 2024-10-04

## 2024-10-04 VITALS
HEIGHT: 67 IN | HEART RATE: 107 BPM | SYSTOLIC BLOOD PRESSURE: 144 MMHG | WEIGHT: 185 LBS | BODY MASS INDEX: 29.03 KG/M2 | OXYGEN SATURATION: 98 % | DIASTOLIC BLOOD PRESSURE: 89 MMHG

## 2024-10-04 DIAGNOSIS — Z87.09 PERSONAL HISTORY OF OTHER DISEASES OF THE RESPIRATORY SYSTEM: ICD-10-CM

## 2024-10-04 DIAGNOSIS — Z15.01 GENETIC SUSCEPTIBILITY TO MALIGNANT NEOPLASM OF BREAST: ICD-10-CM

## 2024-10-04 DIAGNOSIS — D21.9 BENIGN NEOPLASM OF CONNECTIVE AND OTHER SOFT TISSUE, UNSPECIFIED: ICD-10-CM

## 2024-10-04 DIAGNOSIS — N93.9 ABNORMAL UTERINE AND VAGINAL BLEEDING, UNSPECIFIED: ICD-10-CM

## 2024-10-04 DIAGNOSIS — Z15.09 GENETIC SUSCEPTIBILITY TO MALIGNANT NEOPLASM OF BREAST: ICD-10-CM

## 2024-10-04 PROCEDURE — 99205 OFFICE O/P NEW HI 60 MIN: CPT

## 2024-10-04 PROCEDURE — 99459 PELVIC EXAMINATION: CPT

## 2024-10-04 NOTE — HISTORY OF PRESENT ILLNESS
[FreeTextEntry1] : 44yo  LMP 24  referred for BRCA2+ mutation diagnosed in 2016. Pt with AUB as long as she can rememebr but never had a full work up and was not started on med mgmt with [facility] given her BRCA2+ status. She learned she had a submucosal fibroid 2024 only after [facility] did screening MRI abdom/pelvis as part of baseline assessment of BRCA2+ status. Pt underwent uterine artery embolization end of 2024 and reports that most recent period much improved from prior.  Pt with strong family hx of breast cancer but denies other fam hx of cancers. She is up to date with health maintenance and has consulted with breast surgeon re prophylactic mastectomy. That surgeon left the practice and now pt is working on reestablishing care. She is undecided on her future fertility and would like to discuss options and recommendations on RRS.  denies n/v/fever/bleeding/bloating. Reports normal urination and BMs.   PMHx: asthma, anemia, hypothyroid PSHx: UAE Tony 2024, LEEP  OBGYNHx: hx of lichen sclerosis,  hx of fibroids/cysts/abn paps, most recent pap WNL denies stis FamHx: mother breast ca age 57, maternal grandmother breast ca age 60, recurrence age 80; grandmother's twin sister with breast cancer, all BRCA2+; denies hx of GYN ca, colon ca SocHx: social etoh, denies smoking, illicit drugs All: Injectofer   mammogram: up to date and WNL, hxx of biopsy and clip placement colonoscopy: up to date and WNL   Labs: 24  = 23 23: pap: NILM, HPV neg  2015: BRCA2+  Imagin24 MRI A/P: FINDINGS:    LOWER CHEST: Few sub-0.4 cm pulmonary nodules, appear similar to CT chest 2021, however  evaluation limited on MR technique.        LIVER: Within normal limits.    BILE DUCTS: Normal caliber.    GALLBLADDER: Within normal limits.    SPLEEN: Within normal limits.    PANCREAS: Within normal limits. No main pancreatic duct dilatation.    ADRENALS: Within normal limits.    KIDNEYS/URETERS: Within normal limits.        BLADDER: Within normal limits.    REPRODUCTIVE ORGANS: Uterus: 10 x 6.1 x 6.9 cm. Asymmetric enlargement of the anterior uterine body with suggestion of a poorly circumscribed T1 isointense, T2 hypointense, enhancing mass (26-48)  measuring approximately 4.7 x 4.1 x 3.3 cm, likely representing a submucosal fibroid with greater  than 50% intramural component. Additional lesion in the posterior uterine body (26-55), 1.4 x 1.4  cm, compatible with an intramural fibroid.    Endometrium: 7 mm. Within normal limits.    Right ovary: 2.9 x 2.2 cm. Right ovarian cystic lesion with peripheral enhancement, 2.1 x 1.5 cm,  likely corpus luteal or hemorrhagic cyst.    Left ovary: 2.1 x 1.2 cm. Within normal limits.        BOWEL: No bowel obstruction.     PERITONEUM: No ascites.    VESSELS: Within normal limits.    RETROPERITONEUM/LYMPH NODES: No lymphadenopathy.      ABDOMINAL WALL: Within normal limits.    BONES: Within normal limits.       IMPRESSION:     Uterine fibroids.    Right ovarian cystic lesion with peripheral enhancement, 2.1 x 1.5 cm, likely corpus luteal or  hemorrhagic cyst.    Normal pancreas.

## 2024-10-04 NOTE — DISCUSSION/SUMMARY
[Reviewed Clinical Lab Test(s)] : Results of clinical tests were reviewed. [Reviewed Radiology Report(s)] : Radiology reports were reviewed. [Reviewed Radiology Film/Image(s)] : Images from radiology studies were reviewed and examined. [Discuss Alternatives/Risks/Benefits w/Patient] : All alternatives, risks, and benefits were discussed with the patient/family and all questions were answered.  Patient expressed good understanding and appreciates the importance of follow up as recommended. [Visit Time ___ Minutes] : [unfilled] minutes [Face to Face Time___ Minutes] : with [unfilled] minutes in face to face consultation. [FreeTextEntry1] : 44yo P0 w/ BRCA2+ mutation, AUB-fibroid uterus s/p UAE with improvement in menses. Considering RR surgery. -more than 50% of visit spent face to face with patient reviewing records and interpreting imaging/path/lab results, counseling and coordinating care  -I reviewed in detail her BRCA2+ status. I explained that in patients with known deleterious BRCA2 mutation there are several benefits to prophylactic BSO. One is the decreased risk of developing ovarian cancer, the one other benefit is a decrease in the risk of HR+ breast cancer. The lifetime risk of developing ovarian cancer in patients with deleterious BRCA2 mutation is 20% -I explained that the current recommendation from our governing bodies ( SGO and ACOG ) is that patients undergo RRBSO at the age of 35 or when childbearing is complete. Current data suggest that the origin of most high-grade serous tumors associated with BRCA1 mutation arise from the fallopian tube. A study assessing the effect of risk reducing salpingectomy (without removal of the ovaries) is ongoing. Without these results we cannot know if RRS will be as effective as RRBSO in decreasing risk of ovarian cancer. However, based on current SGO recommendations, it can be considered as an option in younger women who do not yet want to enter surgical menopause. -I explained to patient that although guidelines state age 35 or when childbearing complete, there is data that supports delaying RR surgery in BRCA2 carriers to 45-49yo since highest risk for ovarian cancer falls in 50-60th decades as opposed to BRCA1 carriers whose highest risk for ovarian cancer is in 40th decade. -I explained role of Hysterectomy and that it is not recommended as part of standard RR surgery guideline although can be an option for BRCA1+ patients given the 5-8% risk of developing uterine cancer in this population, or an elective option if patient desires removal. I explained that it can be offered to make ease of HRT administration easier since without the uterus a person can receive Estrogen only HRT as opposed to dual therapy with E + Progesterone when uterus us retained.  -I explained the side effects of pre-menopausal removal of the ovaries in detail. Effectively removal of the ovaries places patients in surgical menopause. Common symptoms include hot flashes, decrease in cognition, skin changes, weight gain, depression, increased heart disease risk/stroke risk/alzheimer's risk, and bone loss. I did review role of HRT as an option post-surgery to reduce sxs and risks. I explained that breast cancer risk does not change if HRT is started however most BRCA carriers prefer removing all breast tissue prior to starting HRT. -Finally I reviewed screening options, although screening modalities have been proven ineffective in detecting ovarian cancer at earlier stages. In the absence of better options pelvic exams, transvaginal ultrasounds and CA-125 every 6 months can be considered for patients who decline surgical intervention. --At this time patient would like to continue surveillance with plan for RR surgery around 50yo, and she would like to visit with HARLAN to definitively commit to no fertility or attempt at conception based on their counseling. Patient does admit she is very close to accepting that childbearing is not in the cards for her. -Pt will establish care with breast surgery team with alan and re-visit surveillance and surgery options. -AUB-fibroid - improved now after UAE. will plan pelvic sono 11/2024 as follow up and if pt with recurrence of AUB or abnl findings on sono, then will perform EMbx -televisit 11/2024 for results review of sono -rtc for 6 month surveillance in person with sono and ca125 -pain/fever/bleeding precautions given

## 2024-10-04 NOTE — PHYSICAL EXAM
[Chaperone Present] : A chaperone was present in the examining room during all aspects of the physical examination [63143] : A chaperone was present during the pelvic exam. [Fully active, able to carry on all pre-disease performance without restriction] : Status 0 - Fully active, able to carry on all pre-disease performance without restriction

## 2024-10-04 NOTE — DISCUSSION/SUMMARY
[Reviewed Clinical Lab Test(s)] : Results of clinical tests were reviewed. [Reviewed Radiology Report(s)] : Radiology reports were reviewed. [Reviewed Radiology Film/Image(s)] : Images from radiology studies were reviewed and examined. [Discuss Alternatives/Risks/Benefits w/Patient] : All alternatives, risks, and benefits were discussed with the patient/family and all questions were answered.  Patient expressed good understanding and appreciates the importance of follow up as recommended. [Visit Time ___ Minutes] : [unfilled] minutes [Face to Face Time___ Minutes] : with [unfilled] minutes in face to face consultation. [FreeTextEntry1] : 42yo P0 w/ BRCA2+ mutation, AUB-fibroid uterus s/p UAE with improvement in menses. Considering RR surgery. -more than 50% of visit spent face to face with patient reviewing records and interpreting imaging/path/lab results, counseling and coordinating care  -I reviewed in detail her BRCA2+ status. I explained that in patients with known deleterious BRCA2 mutation there are several benefits to prophylactic BSO. One is the decreased risk of developing ovarian cancer, the one other benefit is a decrease in the risk of HR+ breast cancer. The lifetime risk of developing ovarian cancer in patients with deleterious BRCA2 mutation is 20% -I explained that the current recommendation from our governing bodies ( SGO and ACOG ) is that patients undergo RRBSO at the age of 35 or when childbearing is complete. Current data suggest that the origin of most high-grade serous tumors associated with BRCA1 mutation arise from the fallopian tube. A study assessing the effect of risk reducing salpingectomy (without removal of the ovaries) is ongoing. Without these results we cannot know if RRS will be as effective as RRBSO in decreasing risk of ovarian cancer. However, based on current SGO recommendations, it can be considered as an option in younger women who do not yet want to enter surgical menopause. -I explained to patient that although guidelines state age 35 or when childbearing complete, there is data that supports delaying RR surgery in BRCA2 carriers to 45-49yo since highest risk for ovarian cancer falls in 50-60th decades as opposed to BRCA1 carriers whose highest risk for ovarian cancer is in 40th decade. -I explained role of Hysterectomy and that it is not recommended as part of standard RR surgery guideline although can be an option for BRCA1+ patients given the 5-8% risk of developing uterine cancer in this population, or an elective option if patient desires removal. I explained that it can be offered to make ease of HRT administration easier since without the uterus a person can receive Estrogen only HRT as opposed to dual therapy with E + Progesterone when uterus us retained.  -I explained the side effects of pre-menopausal removal of the ovaries in detail. Effectively removal of the ovaries places patients in surgical menopause. Common symptoms include hot flashes, decrease in cognition, skin changes, weight gain, depression, increased heart disease risk/stroke risk/alzheimer's risk, and bone loss. I did review role of HRT as an option post-surgery to reduce sxs and risks. I explained that breast cancer risk does not change if HRT is started however most BRCA carriers prefer removing all breast tissue prior to starting HRT. -Finally I reviewed screening options, although screening modalities have been proven ineffective in detecting ovarian cancer at earlier stages. In the absence of better options pelvic exams, transvaginal ultrasounds and CA-125 every 6 months can be considered for patients who decline surgical intervention. --At this time patient would like to continue surveillance with plan for RR surgery around 50yo, and she would like to visit with HARLAN to definitively commit to no fertility or attempt at conception based on their counseling. Patient does admit she is very close to accepting that childbearing is not in the cards for her. -Pt will establish care with breast surgery team with alan and re-visit surveillance and surgery options. -AUB-fibroid - improved now after UAE. will plan pelvic sono 11/2024 as follow up and if pt with recurrence of AUB or abnl findings on sono, then will perform EMbx -televisit 11/2024 for results review of sono -rtc for 6 month surveillance in person with sono and ca125 -pain/fever/bleeding precautions given

## 2024-10-04 NOTE — HISTORY OF PRESENT ILLNESS
[FreeTextEntry1] : 44yo  LMP 24  referred for BRCA2+ mutation diagnosed in 2016. Pt with AUB as long as she can rememebr but never had a full work up and was not started on med mgmt with [facility] given her BRCA2+ status. She learned she had a submucosal fibroid 2024 only after [facility] did screening MRI abdom/pelvis as part of baseline assessment of BRCA2+ status. Pt underwent uterine artery embolization end of 2024 and reports that most recent period much improved from prior.  Pt with strong family hx of breast cancer but denies other fam hx of cancers. She is up to date with health maintenance and has consulted with breast surgeon re prophylactic mastectomy. That surgeon left the practice and now pt is working on reestablishing care. She is undecided on her future fertility and would like to discuss options and recommendations on RRS.  denies n/v/fever/bleeding/bloating. Reports normal urination and BMs.   PMHx: asthma, anemia, hypothyroid PSHx: UAE Tony 2024, LEEP  OBGYNHx: hx of lichen sclerosis,  hx of fibroids/cysts/abn paps, most recent pap WNL denies stis FamHx: mother breast ca age 57, maternal grandmother breast ca age 60, recurrence age 80; grandmother's twin sister with breast cancer, all BRCA2+; denies hx of GYN ca, colon ca SocHx: social etoh, denies smoking, illicit drugs All: Injectofer   mammogram: up to date and WNL, hxx of biopsy and clip placement colonoscopy: up to date and WNL   Labs: 24  = 23 23: pap: NILM, HPV neg  2015: BRCA2+  Imagin24 MRI A/P: FINDINGS:    LOWER CHEST: Few sub-0.4 cm pulmonary nodules, appear similar to CT chest 2021, however  evaluation limited on MR technique.        LIVER: Within normal limits.    BILE DUCTS: Normal caliber.    GALLBLADDER: Within normal limits.    SPLEEN: Within normal limits.    PANCREAS: Within normal limits. No main pancreatic duct dilatation.    ADRENALS: Within normal limits.    KIDNEYS/URETERS: Within normal limits.        BLADDER: Within normal limits.    REPRODUCTIVE ORGANS: Uterus: 10 x 6.1 x 6.9 cm. Asymmetric enlargement of the anterior uterine body with suggestion of a poorly circumscribed T1 isointense, T2 hypointense, enhancing mass (26-48)  measuring approximately 4.7 x 4.1 x 3.3 cm, likely representing a submucosal fibroid with greater  than 50% intramural component. Additional lesion in the posterior uterine body (26-55), 1.4 x 1.4  cm, compatible with an intramural fibroid.    Endometrium: 7 mm. Within normal limits.    Right ovary: 2.9 x 2.2 cm. Right ovarian cystic lesion with peripheral enhancement, 2.1 x 1.5 cm,  likely corpus luteal or hemorrhagic cyst.    Left ovary: 2.1 x 1.2 cm. Within normal limits.        BOWEL: No bowel obstruction.     PERITONEUM: No ascites.    VESSELS: Within normal limits.    RETROPERITONEUM/LYMPH NODES: No lymphadenopathy.      ABDOMINAL WALL: Within normal limits.    BONES: Within normal limits.       IMPRESSION:     Uterine fibroids.    Right ovarian cystic lesion with peripheral enhancement, 2.1 x 1.5 cm, likely corpus luteal or  hemorrhagic cyst.    Normal pancreas.

## 2024-10-04 NOTE — PHYSICAL EXAM
[Chaperone Present] : A chaperone was present in the examining room during all aspects of the physical examination [58101] : A chaperone was present during the pelvic exam. [Fully active, able to carry on all pre-disease performance without restriction] : Status 0 - Fully active, able to carry on all pre-disease performance without restriction

## 2024-11-06 ENCOUNTER — NON-APPOINTMENT (OUTPATIENT)
Age: 44
End: 2024-11-06

## 2024-11-06 ENCOUNTER — APPOINTMENT (OUTPATIENT)
Dept: BREAST CENTER | Facility: CLINIC | Age: 44
End: 2024-11-06
Payer: COMMERCIAL

## 2024-11-06 VITALS
DIASTOLIC BLOOD PRESSURE: 79 MMHG | BODY MASS INDEX: 29.03 KG/M2 | SYSTOLIC BLOOD PRESSURE: 125 MMHG | WEIGHT: 185 LBS | HEART RATE: 69 BPM | HEIGHT: 67 IN

## 2024-11-06 DIAGNOSIS — Z91.89 OTHER SPECIFIED PERSONAL RISK FACTORS, NOT ELSEWHERE CLASSIFIED: ICD-10-CM

## 2024-11-06 DIAGNOSIS — Z12.31 ENCOUNTER FOR SCREENING MAMMOGRAM FOR MALIGNANT NEOPLASM OF BREAST: ICD-10-CM

## 2024-11-06 DIAGNOSIS — Z80.3 FAMILY HISTORY OF MALIGNANT NEOPLASM OF BREAST: ICD-10-CM

## 2024-11-06 DIAGNOSIS — Z15.01 GENETIC SUSCEPTIBILITY TO MALIGNANT NEOPLASM OF BREAST: ICD-10-CM

## 2024-11-06 DIAGNOSIS — R92.30 INCONCLUSIVE MAMMOGRAM: ICD-10-CM

## 2024-11-06 DIAGNOSIS — Z15.09 GENETIC SUSCEPTIBILITY TO MALIGNANT NEOPLASM OF BREAST: ICD-10-CM

## 2024-11-06 DIAGNOSIS — Z84.81 FAMILY HISTORY OF CARRIER OF GENETIC DISEASE: ICD-10-CM

## 2024-11-06 DIAGNOSIS — R92.2 INCONCLUSIVE MAMMOGRAM: ICD-10-CM

## 2024-11-06 PROCEDURE — 99204 OFFICE O/P NEW MOD 45 MIN: CPT

## 2024-11-11 ENCOUNTER — NON-APPOINTMENT (OUTPATIENT)
Age: 44
End: 2024-11-11

## 2025-01-02 ENCOUNTER — APPOINTMENT (OUTPATIENT)
Dept: HEMATOLOGY ONCOLOGY | Facility: CLINIC | Age: 45
End: 2025-01-02

## 2025-01-02 PROCEDURE — 99204 OFFICE O/P NEW MOD 45 MIN: CPT

## 2025-01-08 ENCOUNTER — RESULT REVIEW (OUTPATIENT)
Age: 45
End: 2025-01-08

## 2025-01-14 ENCOUNTER — NON-APPOINTMENT (OUTPATIENT)
Age: 45
End: 2025-01-14

## 2025-03-12 ENCOUNTER — APPOINTMENT (OUTPATIENT)
Dept: OPHTHALMOLOGY | Facility: CLINIC | Age: 45
End: 2025-03-12

## 2025-04-03 ENCOUNTER — APPOINTMENT (OUTPATIENT)
Dept: ENDOCRINOLOGY | Facility: CLINIC | Age: 45
End: 2025-04-03

## 2025-05-16 ENCOUNTER — APPOINTMENT (OUTPATIENT)
Dept: BREAST CENTER | Facility: CLINIC | Age: 45
End: 2025-05-16

## 2025-05-22 ENCOUNTER — APPOINTMENT (OUTPATIENT)
Dept: ENDOCRINOLOGY | Facility: CLINIC | Age: 45
End: 2025-05-22

## 2025-08-12 ENCOUNTER — APPOINTMENT (OUTPATIENT)
Dept: OBGYN | Facility: CLINIC | Age: 45
End: 2025-08-12

## 2025-08-12 ENCOUNTER — LABORATORY RESULT (OUTPATIENT)
Age: 45
End: 2025-08-12

## 2025-08-12 ENCOUNTER — NON-APPOINTMENT (OUTPATIENT)
Age: 45
End: 2025-08-12

## 2025-08-12 VITALS
SYSTOLIC BLOOD PRESSURE: 122 MMHG | BODY MASS INDEX: 27.62 KG/M2 | WEIGHT: 176 LBS | HEIGHT: 67 IN | DIASTOLIC BLOOD PRESSURE: 80 MMHG

## 2025-08-12 DIAGNOSIS — Z01.419 ENCOUNTER FOR GYNECOLOGICAL EXAMINATION (GENERAL) (ROUTINE) W/OUT ABNORMAL FINDINGS: ICD-10-CM

## 2025-08-12 DIAGNOSIS — Z11.51 ENCOUNTER FOR SCREENING FOR HUMAN PAPILLOMAVIRUS (HPV): ICD-10-CM

## 2025-08-12 DIAGNOSIS — Z12.4 ENCOUNTER FOR SCREENING FOR MALIGNANT NEOPLASM OF CERVIX: ICD-10-CM

## 2025-08-12 DIAGNOSIS — Z11.3 ENCOUNTER FOR SCREENING FOR INFECTIONS WITH A PREDOMINANTLY SEXUAL MODE OF TRANSMISSION: ICD-10-CM

## 2025-08-12 DIAGNOSIS — Z12.39 ENCOUNTER FOR OTHER SCREENING FOR MALIGNANT NEOPLASM OF BREAST: ICD-10-CM

## 2025-08-12 DIAGNOSIS — R92.343 MAMMOGRAPHIC EXTREME DENSITY, BILATERAL BREASTS: ICD-10-CM

## 2025-08-12 DIAGNOSIS — Z12.31 ENCOUNTER FOR SCREENING MAMMOGRAM FOR MALIGNANT NEOPLASM OF BREAST: ICD-10-CM

## 2025-08-12 PROCEDURE — 99396 PREV VISIT EST AGE 40-64: CPT

## 2025-08-12 PROCEDURE — 36415 COLL VENOUS BLD VENIPUNCTURE: CPT

## 2025-08-13 ENCOUNTER — RESULT REVIEW (OUTPATIENT)
Age: 45
End: 2025-08-13

## 2025-08-13 LAB
HBV SURFACE AG SER QL: NONREACTIVE
HCV AB SER QL: NONREACTIVE
HCV S/CO RATIO: 0.1 S/CO
HIV1+2 AB SPEC QL IA.RAPID: NONREACTIVE
T PALLIDUM AB SER QL IA: NEGATIVE

## 2025-08-14 LAB — HPV HIGH+LOW RISK DNA PNL CVX: NOT DETECTED

## 2025-08-16 LAB — CYTOLOGY CVX/VAG DOC THIN PREP: NORMAL

## 2025-08-27 ENCOUNTER — TRANSCRIPTION ENCOUNTER (OUTPATIENT)
Age: 45
End: 2025-08-27